# Patient Record
Sex: MALE | Race: WHITE | NOT HISPANIC OR LATINO | Employment: FULL TIME | URBAN - METROPOLITAN AREA
[De-identification: names, ages, dates, MRNs, and addresses within clinical notes are randomized per-mention and may not be internally consistent; named-entity substitution may affect disease eponyms.]

---

## 2018-08-24 ENCOUNTER — TRANSFERRED RECORDS (OUTPATIENT)
Dept: HEALTH INFORMATION MANAGEMENT | Facility: CLINIC | Age: 28
End: 2018-08-24

## 2018-09-01 ENCOUNTER — RADIANT APPOINTMENT (OUTPATIENT)
Dept: CT IMAGING | Facility: CLINIC | Age: 28
End: 2018-09-01
Attending: ORTHOPAEDIC SURGERY
Payer: COMMERCIAL

## 2018-09-01 DIAGNOSIS — M25.562 LEFT KNEE PAIN: ICD-10-CM

## 2018-09-04 LAB — RADIOLOGIST FLAGS: NORMAL

## 2024-07-29 ENCOUNTER — OFFICE VISIT (OUTPATIENT)
Dept: URGENT CARE | Facility: URGENT CARE | Age: 34
End: 2024-07-29
Payer: COMMERCIAL

## 2024-07-29 VITALS
RESPIRATION RATE: 16 BRPM | OXYGEN SATURATION: 98 % | HEART RATE: 71 BPM | SYSTOLIC BLOOD PRESSURE: 135 MMHG | DIASTOLIC BLOOD PRESSURE: 90 MMHG | TEMPERATURE: 97.5 F

## 2024-07-29 DIAGNOSIS — S61.219A LACERATION OF FINGER OF LEFT HAND WITHOUT FOREIGN BODY WITHOUT DAMAGE TO NAIL, UNSPECIFIED FINGER, INITIAL ENCOUNTER: Primary | ICD-10-CM

## 2024-07-29 PROCEDURE — 12002 RPR S/N/AX/GEN/TRNK2.6-7.5CM: CPT | Performed by: PHYSICIAN ASSISTANT

## 2024-07-29 RX ORDER — LISINOPRIL 2.5 MG/1
2.5 TABLET ORAL
COMMUNITY
Start: 2024-01-18

## 2024-07-29 RX ORDER — LISDEXAMFETAMINE DIMESYLATE 50 MG/1
50 CAPSULE ORAL DAILY
COMMUNITY

## 2024-07-29 RX ORDER — METHYLPHENIDATE HYDROCHLORIDE 27 MG/1
27 TABLET ORAL
COMMUNITY

## 2024-07-29 NOTE — PROGRESS NOTES
SUBJECTIVE:     Chief Complaint   Patient presents with    Urgent Care    Laceration     Patient presents with cutting two of his fingers on his left hand on a dryer and possibly needing stitches.     Alfonzo Estrada is a 34 year old male who presents to the clinic with a laceration on the left Ring finger and index finger sustained 1 hour(s) ago.  This is a accidental injury.    Mechanism of injury: sheet metal.    Associated symptoms: Denies loss of consciousness, vomiting or confusion.  Denies numbness, weakness, or loss of function  Last tetanus booster within 10 years: yes    EXAM:   The patient appears today in alert,no apparent distress distress  VITALS: BP (!) 135/90 (BP Location: Right arm)   Pulse 71   Temp 97.5  F (36.4  C) (Temporal)   Resp 16   SpO2 98%     Size of lacerations: 3 centimeters on the ringer finger and 2 cm on the index finger  Characteristics of the laceration: bleeding- mild, clean, and straight  Tendon function intact: yes  Sensation to light touch intact: yes  Pulses intact: yes  Picture included in patient's chart: no    Assessment:  (S61.219A) Laceration of finger of left hand without foreign body without damage to nail, unspecified finger, initial encounter  (primary encounter diagnosis)  Plan: REPAIR SUPERFICIAL, WOUND BODY 2.6-7.5 CM    PLAN:  PROCEDURE NOTE::  Wound was locally injected with 5 cc's of Lidocaine 2% plain  Good anesthesia was obtained  Prepped and draped in the usual sterile fashion  Wound cleaned with HIBICLENS  Wound cleaned with betadine/saline solution  Wound soaked  Wound irrigated  Laceration was closed using 8 4-0 Ethilon  interrupted sutures  After care instructions:  Keep wound clean and dry for the next 24-48 hours  Sutures out in 8 days  Signs of infection discussed today  Apply anti-bacterial ointment for 8 days  May return to work as long as wound is kept clean and dry  Discussed the probability of scarring